# Patient Record
Sex: FEMALE | URBAN - METROPOLITAN AREA
[De-identification: names, ages, dates, MRNs, and addresses within clinical notes are randomized per-mention and may not be internally consistent; named-entity substitution may affect disease eponyms.]

---

## 2019-09-16 ENCOUNTER — OFFICE VISIT (OUTPATIENT)
Dept: URBAN - METROPOLITAN AREA CLINIC 81 | Facility: CLINIC | Age: 54
End: 2019-09-16

## 2019-09-16 DIAGNOSIS — H52.4 PRESBYOPIA: Primary | ICD-10-CM

## 2019-09-16 DIAGNOSIS — H11.011 AMYLOID PTERYGIUM OF RIGHT EYE: ICD-10-CM

## 2019-09-16 PROCEDURE — 92004 COMPRE OPH EXAM NEW PT 1/>: CPT | Performed by: OPTOMETRIST

## 2019-09-16 ASSESSMENT — INTRAOCULAR PRESSURE
OS: 12
OD: 13

## 2019-09-16 ASSESSMENT — KERATOMETRY
OS: 38.38
OD: 38.13

## 2019-09-16 ASSESSMENT — VISUAL ACUITY
OD: 20/20
OS: 20/20

## 2019-09-16 NOTE — IMPRESSION/PLAN
Impression: Amyloid pterygium of right eye: H11.011. Plan: Recommend UV protection, frequent NP AT. Will continue to observe/monitor. Pt understands.